# Patient Record
Sex: FEMALE | Race: NATIVE HAWAIIAN OR OTHER PACIFIC ISLANDER
[De-identification: names, ages, dates, MRNs, and addresses within clinical notes are randomized per-mention and may not be internally consistent; named-entity substitution may affect disease eponyms.]

---

## 2018-06-04 ENCOUNTER — HOSPITAL ENCOUNTER (OUTPATIENT)
Dept: HOSPITAL 47 - LABWHC1 | Age: 68
Discharge: HOME | End: 2018-06-04
Attending: NURSE PRACTITIONER
Payer: COMMERCIAL

## 2018-06-04 DIAGNOSIS — N18.3: ICD-10-CM

## 2018-06-04 DIAGNOSIS — E83.39: ICD-10-CM

## 2018-06-04 DIAGNOSIS — E11.22: ICD-10-CM

## 2018-06-04 DIAGNOSIS — N39.0: ICD-10-CM

## 2018-06-04 DIAGNOSIS — D63.1: ICD-10-CM

## 2018-06-04 DIAGNOSIS — R80.9: Primary | ICD-10-CM

## 2018-06-04 DIAGNOSIS — E79.0: ICD-10-CM

## 2018-06-04 LAB
ANION GAP SERPL CALC-SCNC: 11 MMOL/L
BUN SERPL-SCNC: 56 MG/DL (ref 7–17)
CALCIUM SPEC-MCNC: 9.7 MG/DL (ref 8.4–10.2)
CHLORIDE SERPL-SCNC: 107 MMOL/L (ref 98–107)
CO2 SERPL-SCNC: 23 MMOL/L (ref 22–30)
ERYTHROCYTE [DISTWIDTH] IN BLOOD BY AUTOMATED COUNT: 3.81 M/UL (ref 3.8–5.4)
ERYTHROCYTE [DISTWIDTH] IN BLOOD: 13.9 % (ref 11.5–15.5)
GLUCOSE SERPL-MCNC: 175 MG/DL (ref 74–99)
GLUCOSE UR QL: (no result)
HCT VFR BLD AUTO: 34.9 % (ref 34–46)
HGB BLD-MCNC: 11.2 GM/DL (ref 11.4–16)
MAGNESIUM SPEC-SCNC: 2 MG/DL (ref 1.6–2.3)
MCH RBC QN AUTO: 29.4 PG (ref 25–35)
MCHC RBC AUTO-ENTMCNC: 32.1 G/DL (ref 31–37)
MCV RBC AUTO: 91.7 FL (ref 80–100)
PH UR: 6 [PH] (ref 5–8)
PLATELET # BLD AUTO: 229 K/UL (ref 150–450)
POTASSIUM SERPL-SCNC: 5.1 MMOL/L (ref 3.5–5.1)
PROT UR QL: (no result)
RBC UR QL: 1 /HPF (ref 0–5)
SODIUM SERPL-SCNC: 141 MMOL/L (ref 137–145)
SP GR UR: 1.01 (ref 1–1.03)
URATE SERPL-MCNC: 7 MG/DL (ref 3.7–7.4)
UROBILINOGEN UR QL STRIP: <2 MG/DL (ref ?–2)
WBC # BLD AUTO: 8.3 K/UL (ref 3.8–10.6)
WBC #/AREA URNS HPF: 1 /HPF (ref 0–5)

## 2018-06-04 PROCEDURE — 83540 ASSAY OF IRON: CPT

## 2018-06-04 PROCEDURE — 87340 HEPATITIS B SURFACE AG IA: CPT

## 2018-06-04 PROCEDURE — 86255 FLUORESCENT ANTIBODY SCREEN: CPT

## 2018-06-04 PROCEDURE — 86160 COMPLEMENT ANTIGEN: CPT

## 2018-06-04 PROCEDURE — 84156 ASSAY OF PROTEIN URINE: CPT

## 2018-06-04 PROCEDURE — 83036 HEMOGLOBIN GLYCOSYLATED A1C: CPT

## 2018-06-04 PROCEDURE — 86803 HEPATITIS C AB TEST: CPT

## 2018-06-04 PROCEDURE — 84166 PROTEIN E-PHORESIS/URINE/CSF: CPT

## 2018-06-04 PROCEDURE — 86706 HEP B SURFACE ANTIBODY: CPT

## 2018-06-04 PROCEDURE — 82043 UR ALBUMIN QUANTITATIVE: CPT

## 2018-06-04 PROCEDURE — 80048 BASIC METABOLIC PNL TOTAL CA: CPT

## 2018-06-04 PROCEDURE — 83516 IMMUNOASSAY NONANTIBODY: CPT

## 2018-06-04 PROCEDURE — 86225 DNA ANTIBODY NATIVE: CPT

## 2018-06-04 PROCEDURE — 81001 URINALYSIS AUTO W/SCOPE: CPT

## 2018-06-04 PROCEDURE — 82306 VITAMIN D 25 HYDROXY: CPT

## 2018-06-04 PROCEDURE — 82728 ASSAY OF FERRITIN: CPT

## 2018-06-04 PROCEDURE — 83550 IRON BINDING TEST: CPT

## 2018-06-04 PROCEDURE — 36415 COLL VENOUS BLD VENIPUNCTURE: CPT

## 2018-06-04 PROCEDURE — 83735 ASSAY OF MAGNESIUM: CPT

## 2018-06-04 PROCEDURE — 86162 COMPLEMENT TOTAL (CH50): CPT

## 2018-06-04 PROCEDURE — 86334 IMMUNOFIX E-PHORESIS SERUM: CPT

## 2018-06-04 PROCEDURE — 84100 ASSAY OF PHOSPHORUS: CPT

## 2018-06-04 PROCEDURE — 83883 ASSAY NEPHELOMETRY NOT SPEC: CPT

## 2018-06-04 PROCEDURE — 83970 ASSAY OF PARATHORMONE: CPT

## 2018-06-04 PROCEDURE — 86038 ANTINUCLEAR ANTIBODIES: CPT

## 2018-06-04 PROCEDURE — 82570 ASSAY OF URINE CREATININE: CPT

## 2018-06-04 PROCEDURE — 85027 COMPLETE CBC AUTOMATED: CPT

## 2018-06-04 PROCEDURE — 84550 ASSAY OF BLOOD/URIC ACID: CPT

## 2018-06-05 LAB
DSDNA AB SER QL: NEGATIVE
DSDNA AB TITR SER: 1 IU/ML
HBA1C MFR BLD: 7.5 % (ref 4–6)
HBV SURFACE AB SERPL IA-ACNC: 3.5 MIU/ML
PTH-INTACT SERPL-MCNC: 99.6 PG/ML (ref 14–72)

## 2018-06-06 LAB
C-ANCA TITR SER: (no result) TITER
P-ANCA TITR SER IF: (no result) TITER

## 2019-02-08 ENCOUNTER — HOSPITAL ENCOUNTER (OUTPATIENT)
Dept: HOSPITAL 47 - RADUSWWP | Age: 69
Discharge: HOME | End: 2019-02-08
Attending: FAMILY MEDICINE
Payer: COMMERCIAL

## 2019-02-08 DIAGNOSIS — I65.29: Primary | ICD-10-CM

## 2019-02-08 PROCEDURE — 93880 EXTRACRANIAL BILAT STUDY: CPT

## 2019-02-09 NOTE — US
EXAMINATION TYPE: US carotid duplex BILAT

 

DATE OF EXAM: 2/8/2019

 

COMPARISON: NONE

 

CLINICAL HISTORY: I65.29 L CAROTID ARTERY STENOSIS.

 

EXAM MEASUREMENTS: 

 

RIGHT:  Peak Systolic Velocity (PSV) cm/sec

----- Right CCA:  84.2  

----- Right ICA:  97.4     

----- Right ECA:  192.1   

ICA/CCA ratio:  1.2    

 

RIGHT:  End Diastole cm/sec

----- Right CCA:  14.9   

----- Right ICA:  21.5      

----- Right ECA:  0.0     

 

LEFT:  Peak Systolic Velocity (PSV) cm/sec

----- Left CCA: 120.5  

----- Left ICA:  102.6   

----- Left ECA:  119.40  

ICA/CCA ratio: 0 .9  

 

LEFT:  End Diastole cm/sec

----- Left CCA:  20.4  

----- Left ICA:  32.2   

----- Left ECA:  0.0 

 

VERTEBRALS (direction of flow):

Right Vertebral: Antegrade

Left Vertebral: Antegrade

 

Rhythm:  Normal

 

Mild atherosclerotic changes with no significant velocity increases of the internal carotid arteries 
or common carotid arteries.

 

 

 

IMPRESSION:

 

1. No hemodynamically significant stenosis within either internal carotid artery are normal, carotid 
artery.

2. Stenosis of 50-69% of the right external carotid artery, incidentally noted.

 

Criteria for Assigning % of Stenosis / Diameter reduction

(Estimation based on the indirect measurements of the internal carotid artery velocities (ICA PSV).

1.  Normal (no stenosis)=ICA PSV < 125 cm/s: ratio < 2.0: ICA EDV<40 cm/s.

2. Less than 50% stenosis=ICA PSV < 125 cm/s: ratio < 2.0: ICA EDV<40 cm/s.

3.  50 to 69% stenosis=ICA PSV of 125 to 230 cm/s: ration 2.0 ? 4.0: ICA EDV  cm/s.

4.  Greater than 70% stenosis to near occlusion= ICA PSV > 230 cm/s: ratio > 4.0: ICA EDV > 100 cm/s.
 

5.  Near occlusion= ICA PSV velocities may be low or undetectable: variable ratio and ICA EDV.

6.  Total occlusion=unable to detect flow.